# Patient Record
Sex: FEMALE | Race: BLACK OR AFRICAN AMERICAN | Employment: STUDENT | URBAN - METROPOLITAN AREA
[De-identification: names, ages, dates, MRNs, and addresses within clinical notes are randomized per-mention and may not be internally consistent; named-entity substitution may affect disease eponyms.]

---
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
## 2021-05-06 ENCOUNTER — HOSPITAL ENCOUNTER (EMERGENCY)
Facility: CLINIC | Age: 21
Discharge: HOME | End: 2021-05-06
Attending: EMERGENCY MEDICINE | Admitting: EMERGENCY MEDICINE
Payer: COMMERCIAL

## 2021-05-06 ENCOUNTER — APPOINTMENT (OUTPATIENT)
Dept: GENERAL RADIOLOGY | Facility: CLINIC | Age: 21
End: 2021-05-06
Attending: EMERGENCY MEDICINE
Payer: COMMERCIAL

## 2021-05-06 VITALS
HEART RATE: 96 BPM | RESPIRATION RATE: 18 BRPM | SYSTOLIC BLOOD PRESSURE: 118 MMHG | OXYGEN SATURATION: 99 % | DIASTOLIC BLOOD PRESSURE: 90 MMHG | TEMPERATURE: 98.6 F

## 2021-05-06 DIAGNOSIS — S46.912A SHOULDER STRAIN, LEFT, INITIAL ENCOUNTER: ICD-10-CM

## 2021-05-06 PROCEDURE — 73030 X-RAY EXAM OF SHOULDER: CPT | Mod: LT

## 2021-05-06 PROCEDURE — 99283 EMERGENCY DEPT VISIT LOW MDM: CPT

## 2021-05-06 PROCEDURE — 250N000013 HC RX MED GY IP 250 OP 250 PS 637: Performed by: EMERGENCY MEDICINE

## 2021-05-06 RX ORDER — HYDROCODONE BITARTRATE AND ACETAMINOPHEN 5; 325 MG/1; MG/1
1 TABLET ORAL ONCE
Status: COMPLETED | OUTPATIENT
Start: 2021-05-06 | End: 2021-05-06

## 2021-05-06 RX ADMIN — HYDROCODONE BITARTRATE AND ACETAMINOPHEN 1 TABLET: 5; 325 TABLET ORAL at 05:49

## 2021-05-06 SDOH — HEALTH STABILITY: MENTAL HEALTH: HOW OFTEN DO YOU HAVE A DRINK CONTAINING ALCOHOL?: NEVER

## 2021-05-06 ASSESSMENT — ENCOUNTER SYMPTOMS: ARTHRALGIAS: 1

## 2021-05-06 NOTE — ED PROVIDER NOTES
History   Chief Complaint:  Shoulder Pain     HPI   Vaibhav Lee is a right-handed 20 year old female who presents with left shoulder pain. The patient reports that earlier tonight she rolled over in bed when she suddenly began having left shoulder pain. The patient states a belief that she dislocated her shoulder. Her pain is currently in the anterior part of her shoulder. The patient has never dislocated her shoulder before.     Review of Systems   Musculoskeletal: Positive for arthralgias (L shoulder).   All other systems reviewed and are negative.    Allergies:  The patient has no known allergies.     Medications:  The patient is not currently taking any prescribed medications.    Past Medical History:    Migraines  Seizure  Anxiety  Adjustment disorder    Social History:  The patient presents to the emergency department alone.     Physical Exam     Physical Exam    Patient Vitals for the past 24 hrs:   BP Temp Temp src Pulse SpO2   05/06/21 0515 -- -- -- -- 99 %   05/06/21 0507 -- -- -- -- 98 %   05/06/21 0505 (!) 108/91 98.6  F (37  C) Oral 96 98 %       General: Alert and Interactive.   Head: No signs of trauma.   Mouth/Throat: Oropharynx is clear and moist.   Eyes: Conjunctivae are normal. Pupils are equal, round, and reactive to light.   Neck: Normal range of motion. No nuchal rigidity.   CV: Normal rate and regular rhythm.    Resp: Effort normal and breath sounds normal. No respiratory distress.   GI: Soft. There is no tenderness or guarding.   MSK: No edema. The left shoulder has tenderness with any range of motion. No swelling or deformity. CMS intact.   Neuro: The patient is alert and oriented to person, place, and time.  PERRLA, EOMI, strength in upper/lower extremities normal and symmetrical.   Sensation normal. Speech normal.  GCS eye subscore is 4. GCS verbal subscore is 5. GCS motor subscore is 6.   Skin: Skin is warm and dry. No rash noted.   Psych: normal mood and affect. behavior is  normal.      Emergency Department Course     Imaging:  XR Shoulder Left G/E 3 Views  IMPRESSION: Normal joint spaces and alignment. No fracture.   Reading per radiology.    Emergency Department Course:    Reviewed:  I reviewed nursing notes, vitals, past medical history and care everywhere    Assessments:  0502 I obtained history and examined the patient as noted above.   0600 I rechecked the patient and explained findings.     Interventions:  0549 Norco 1 tablet Oral     Disposition:  The patient was discharged to home.       Impression & Plan     Medical Decision Making:  Vaibhav Lee is a 20 year old female presents for evaluation after having shoulder pain while rolling over in bed this morning.  Signs and symptoms are consistent with a strain of the shoulder musculature.  A broad differential was considered including sprain, strain, fracture, tendon rupture, nerve impingement/compromise, referred pain. An x-ray ws obtained to rule out dislocation or fracture and this returned negative for any acute findings. Supportive outpatient management is indicated.  Rest, ice, and elevation treatment was discussed with the patient. The patients head to toe trauma exam is otherwise negative for serious underlying disease of the head, neck, chest, abdomen, extremities, pelvis.  Close follow-up with patient's primary care physician per discharge precautions.     Diagnosis:    ICD-10-CM    1. Shoulder strain, left, initial encounter  S46.912A        Discharge Medications:  None    Scribe Disclosure:  EDWIN, Wayne Zuñiga, am serving as a scribe on 5/6/2021 at 5:02 AM to personally document services performed by Melvin Colón MD based on my observations and the provider's statements to me.         Melvin Colón MD  05/06/21 1218

## 2021-06-25 ENCOUNTER — HOSPITAL ENCOUNTER (EMERGENCY)
Facility: CLINIC | Age: 21
Discharge: HOME | End: 2021-06-25
Attending: PHYSICIAN ASSISTANT | Admitting: PHYSICIAN ASSISTANT
Payer: COMMERCIAL

## 2021-06-25 ENCOUNTER — APPOINTMENT (OUTPATIENT)
Dept: ULTRASOUND IMAGING | Facility: CLINIC | Age: 21
End: 2021-06-25
Attending: PHYSICIAN ASSISTANT
Payer: COMMERCIAL

## 2021-06-25 VITALS
RESPIRATION RATE: 16 BRPM | DIASTOLIC BLOOD PRESSURE: 78 MMHG | SYSTOLIC BLOOD PRESSURE: 129 MMHG | BODY MASS INDEX: 33.67 KG/M2 | HEIGHT: 59 IN | WEIGHT: 167 LBS | HEART RATE: 73 BPM | OXYGEN SATURATION: 100 % | TEMPERATURE: 98.1 F

## 2021-06-25 DIAGNOSIS — N91.2 AMENORRHEA: ICD-10-CM

## 2021-06-25 DIAGNOSIS — R10.2 PELVIC PAIN IN FEMALE: ICD-10-CM

## 2021-06-25 LAB
ALBUMIN UR-MCNC: NEGATIVE MG/DL
APPEARANCE UR: CLEAR
BILIRUB UR QL STRIP: NEGATIVE
COLOR UR AUTO: ABNORMAL
GLUCOSE UR STRIP-MCNC: NEGATIVE MG/DL
HCG UR QL: NEGATIVE
HGB UR QL STRIP: NEGATIVE
KETONES UR STRIP-MCNC: NEGATIVE MG/DL
LEUKOCYTE ESTERASE UR QL STRIP: NEGATIVE
MUCOUS THREADS #/AREA URNS LPF: PRESENT /LPF
NITRATE UR QL: NEGATIVE
PH UR STRIP: 5.5 PH (ref 5–7)
RBC #/AREA URNS AUTO: 1 /HPF (ref 0–2)
SOURCE: ABNORMAL
SP GR UR STRIP: 1.02 (ref 1–1.03)
SQUAMOUS #/AREA URNS AUTO: 1 /HPF (ref 0–1)
UROBILINOGEN UR STRIP-MCNC: 0 MG/DL (ref 0–2)
WBC #/AREA URNS AUTO: 1 /HPF (ref 0–5)

## 2021-06-25 PROCEDURE — 76830 TRANSVAGINAL US NON-OB: CPT

## 2021-06-25 PROCEDURE — 81001 URINALYSIS AUTO W/SCOPE: CPT | Performed by: PHYSICIAN ASSISTANT

## 2021-06-25 PROCEDURE — 81025 URINE PREGNANCY TEST: CPT | Performed by: PHYSICIAN ASSISTANT

## 2021-06-25 PROCEDURE — 99285 EMERGENCY DEPT VISIT HI MDM: CPT | Mod: 25

## 2021-06-25 RX ORDER — IBUPROFEN 600 MG/1
600 TABLET, FILM COATED ORAL ONCE
Status: DISCONTINUED | OUTPATIENT
Start: 2021-06-25 | End: 2021-06-25 | Stop reason: HOSPADM

## 2021-06-25 RX ORDER — OXYCODONE HYDROCHLORIDE 5 MG/1
5 TABLET ORAL ONCE
Status: DISCONTINUED | OUTPATIENT
Start: 2021-06-25 | End: 2021-06-25 | Stop reason: HOSPADM

## 2021-06-25 RX ORDER — OXYCODONE HYDROCHLORIDE 5 MG/1
5 TABLET ORAL EVERY 6 HOURS PRN
Qty: 12 TABLET | Refills: 0 | Status: SHIPPED | OUTPATIENT
Start: 2021-06-25 | End: 2021-06-25

## 2021-06-25 RX ORDER — OXYCODONE HYDROCHLORIDE 5 MG/1
5 TABLET ORAL EVERY 6 HOURS PRN
Qty: 12 TABLET | Refills: 0 | Status: SHIPPED | OUTPATIENT
Start: 2021-06-25

## 2021-06-25 ASSESSMENT — ENCOUNTER SYMPTOMS
BACK PAIN: 0
MYALGIAS: 1
CHILLS: 0
DYSURIA: 0
NAUSEA: 1
FLANK PAIN: 0
FEVER: 0
ABDOMINAL PAIN: 1
VOMITING: 0

## 2021-06-25 ASSESSMENT — MIFFLIN-ST. JEOR: SCORE: 1438.14

## 2021-06-25 NOTE — ED PROVIDER NOTES
"  History   Chief Complaint:  Abdominal Pain    The history is provided by the patient.      Vaibhav Lee is a 20 year old female with history of non-epileptic seizures who presents with abdominal pain. The patient reports severe lower abdominal/pelvic cramping that feels like period cramps, though she notes that she has not had a menstrual period for the last four years. She also reports some nausea. The patient denies any back pain, flank pain, fever, chills, vomiting, dysuria, or concern of pregnancy or STI. She is unsure if she is currently experiencing any vaginal bleeding because she put a tampon in. She does not take birth control and does not have an IUD. Has been taking Midol today without relief.     Review of Systems   Constitutional: Negative for chills and fever.   Gastrointestinal: Positive for abdominal pain and nausea. Negative for vomiting.   Genitourinary: Negative for dysuria and flank pain.   Musculoskeletal: Positive for myalgias. Negative for back pain.   All other systems reviewed and are negative.      Allergies:  The patient has no known allergies.     Medications:  The patient is not currently taking any prescribed medications.    Past Medical History:    Convulsion, non-epileptic  Adjustment disorder with mixed anxiety and depressed mood  Psychiatric pseudoseizure  Seizure  Dermatitis      Family History:    Mother: allergies, asthma, stroke     Social History:  The patient presents to the ED alone.     Physical Exam     Patient Vitals for the past 24 hrs:   BP Temp Temp src Pulse Resp SpO2 Height Weight   06/25/21 1424 129/78 98.1  F (36.7  C) Oral 73 16 100 % 1.499 m (4' 11\") 75.8 kg (167 lb)       Physical Exam  Constitutional: Pleasant. Cooperative.   Eyes: Pupils equally round and reactive  HENT: Head is normal in appearance. Oropharynx is normal with moist mucus membranes.  Cardiovascular: Regular rate and rhythm and without murmurs.  Respiratory: Normal respiratory effort, " lungs are clear bilaterally.  GI: TTP of suprapubic region, otherwise non-tender, soft, non-distended. No guarding, rebound, or rigidity.  Musculoskeletal: No asymmetry of the lower extremities, no tenderness to palpation.   Skin: Normal, without rash.  Neurologic: Cranial nerves grossly intact, normal cognition, no focal deficits. Alert and oriented x 3.   Psychiatric: Normal affect.  Nursing notes and vital signs reviewed.      Emergency Department Course     Imaging:  US Pelvis Cmplt w Transvag & Doppler Lmtpel Duplex Limited  No torsion demonstrated.  As per radiology.    Laboratory:  UA with microscopic: Mucous: present o/w WNL   UPT: Negative    Emergency Department Course:    Reviewed:  I reviewed nursing notes, vitals, past medical history and care everywhere    Assessments:  1513 I obtained history and examined the patient as noted above.     Disposition:  The patient was discharged to home.     Impression & Plan     Medical Decision Making:  Vaibhav Lee is a 20 year old female who presents to the ED for evaluation of pelvic pain.  Patient states that this feels identical to her previous episodes of cramping with menses, however slightly worse in severity, prompting presentation to the ED.  Patient notes having amenorrhea for the last 4 years for which she has not sought further evaluation.  See HPI as above for additional details.  Vitals and physical exam as above.  Differential is broad and included UTI, constipation, ovarian torsion, ovarian cyst, fibroid, PID, TOA, ectopic pregnancy, appendicitis, diverticulitis, among others. Pelvic US obtained as above without acute abnormality. No evidence for UTI. HCG is negative. Patient declines chance of STI. Discussed with patient option for additional labs and abdominal CT, however patient notes this feels similar to previous cramping and declines further work up. We discussed she will need further work up to evaluate amenorrhea. Referral provided.  [place] patient was safe for discharge to home. Discussed reasons to return. All questions answered. Patient discharged to home in stable condition.    Covid-19  Vaibhav Lee was evaluated during a global COVID-19 pandemic, which necessitated consideration that the patient might be at risk for infection with the SARS-CoV-2 virus that causes COVID-19.   Applicable protocols for evaluation were followed during the patient's care.     Diagnosis:    ICD-10-CM    1. Pelvic pain in female  R10.2    2. Amenorrhea  N91.2        Discharge Medications:  New Prescriptions    OXYCODONE (ROXICODONE) 5 MG TABLET    Take 1 tablet (5 mg) by mouth every 6 hours as needed for pain       Scribe Disclosure:  I, Ken Browne, am serving as a scribe at 3:13 PM on 6/25/2021 to document services personally performed by Jai Cobos PA-C based on my observations and the provider's statements to me.     This record was created at least in part using electronic voice recognition software, so please excuse any typographical errors.         Jai Cobos PA-C  06/26/21 1000

## 2021-06-25 NOTE — ED TRIAGE NOTES
"Pt reports has not had a menstrual period for the last four years. Today, abdominal cramping began. Pt reports, \"feels like period cramping.\" Pt reports putting a tampon in, therefore unsure if she is bleeding vaginally. Pt reports taking Midol medication without relief. Pt reports no concerns for pregnancy.   "

## 2022-12-08 ENCOUNTER — NURSE TRIAGE (OUTPATIENT)
Dept: NURSING | Facility: CLINIC | Age: 22
End: 2022-12-08

## 2022-12-09 NOTE — TELEPHONE ENCOUNTER
Nurse Triage SBAR    Is this a 2nd Level Triage? NO    Situation: Patient calling with pregnancy questions.  Consent: not needed    Background: Patient states that she may have lost her mucous plug and was wondering if she should go to hospitall    Assessment: Patient plans on delivering at [facility] so was advised to call [facility] for further plans.    Protocol Recommended Disposition:   Call [facility]    Recommendation: Advised patient to Call [facility] and find out if she should go into hospital . Reviewed concerning symptoms and when to call back.         Does the patient meet one of the following criteria for ADS visit consideration? No         Madison Reinoso RN [facility] 2022 9:10 PM                              Reason for Disposition    [1] Pregnant 23 or more weeks AND [2] baby is moving less today (e.g., kick count < 5 in 1 hour or < 10 in 2 hours)    Additional Information    Negative: [1] Vaginal bleeding AND [2] pregnant 20 or more weeks    Negative: [1] Vaginal bleeding AND [2] pregnant < 20 weeks    Negative: [1] Having contractions or other symptoms of labor AND [2] < 37 weeks pregnant (i.e., )    Negative: [1] Having contractions or other symptoms of labor AND [2] 37 or more weeks pregnant (i.e., term pregnancy)    Negative: Leakage of fluid (trickle, gush) from the vagina    Negative: Foreign body in vagina (e.g., tampon)    Negative: Pain or burning with passing urine (urination) is main symptom    Protocols used: PREGNANCY - VAGINAL [id number]